# Patient Record
(demographics unavailable — no encounter records)

---

## 2024-11-06 NOTE — PLAN
[FreeTextEntry1] : In regards to patients Physical exam, routine blood work drawn, will review results with patient. \par  \par  Counseling included abnormal lab results, differential diagnoses, treatment options, risks and benefits, lifestyle changes, current condition, medications, and dose adjustments. \par  The patient was interactive, attentive, asked questions, and verbalized understanding

## 2024-11-06 NOTE — HEALTH RISK ASSESSMENT
[Good] : ~his/her~  mood as  good [Yes] : Yes [Monthly or less (1 pt)] : Monthly or less (1 point) [1 or 2 (0 pts)] : 1 or 2 (0 points) [Never (0 pts)] : Never (0 points) [No] : In the past 12 months have you used drugs other than those required for medical reasons? No [No falls in past year] : Patient reported no falls in the past year [0] : 2) Feeling down, depressed, or hopeless: Not at all (0) [PHQ-2 Negative - No further assessment needed] : PHQ-2 Negative - No further assessment needed [Audit-CScore] : 1 [[id number]] : 0 [Never] : Never [NO] : No [Patient declined Low Dose CT Scan] : Patient declined Low Dose CT Scan [Patient declined Retinal Exam] : Patient declined Retinal Exam. [Patient reported mammogram was normal] : Patient reported mammogram was normal [Patient declined colonoscopy] : Patient declined colonoscopy [HIV test declined] : HIV test declined [Hepatitis C test declined] : Hepatitis C test declined [MammogramDate] : 10/24 [MammogramComments] : follow up with OB/GYN

## 2024-11-06 NOTE — HISTORY OF PRESENT ILLNESS
[FreeTextEntry1] : physical exam  [de-identified] : Ms. LANDY QUINTERO is a 45 year female comes to the office for physical exam. Patient denies fever, cough SOB. No other complaints at this time.

## 2025-01-29 NOTE — ADDENDUM
[FreeTextEntry1] : I, Iona Fink NP, acted as scribe for Dr. Shelley Weber for this patient encounter

## 2025-01-29 NOTE — REASON FOR VISIT
[Initial Evaluation] : an initial evaluation [FreeTextEntry1] : colon cancer screening, father with polyps

## 2025-01-29 NOTE — HISTORY OF PRESENT ILLNESS
[FreeTextEntry1] : LANDY QUINTERO is a 45 year old female with no significant PMH and on no medications, presenting today for colon cancer screening. No prior colonoscopy. Family history of colon polyps in her father. She feels well and offers no complaints. She denies any abdominal pain, bowel changes, rectal bleeding. She moves her bowels regularly. No upper GI complaints. Appetite is good and weight is stable.

## 2025-01-29 NOTE — PHYSICAL EXAM
[Alert] : alert [Normal Voice/Communication] : normal voice/communication [Healthy Appearing] : healthy appearing [No Acute Distress] : no acute distress [Sclera] : the sclera and conjunctiva were normal [Hearing Threshold Finger Rub Not [place]] : hearing was normal [Normal Lips/Gums] : the lips and gums were normal [Oropharynx] : the oropharynx was normal [Normal Appearance] : the appearance of the neck was normal [No Neck Mass] : no neck mass was observed [No Respiratory Distress] : no respiratory distress [No Acc Muscle Use] : no accessory muscle use [Respiration, Rhythm And Depth] : normal respiratory rhythm and effort [Auscultation Breath Sounds / Voice Sounds] : lungs were clear to auscultation bilaterally [Heart Rate And Rhythm] : heart rate was normal and rhythm regular [Normal S1, S2] : normal S1 and S2 [Murmurs] : no murmurs [Bowel Sounds] : normal bowel sounds [Abdomen Tenderness] : non-tender [No Masses] : no abdominal mass palpated [Abdomen Soft] : soft [] : no hepatosplenomegaly [Oriented To Time, Place, And Person] : oriented to person, place, and time

## 2025-01-29 NOTE — ASSESSMENT
[FreeTextEntry1] : 45 year old female with family history of colon polyps presenting for colon cancer screening. She will be scheduled for a colonoscopy with MiraLAX prep for further evaluation. I have discussed the indications (including but not limited to ruling out inflammatory bowel disease, colorectal neoplasm, GI bleed, and AVM's), benefits, risks  (including but not limited to reaction to the anesthesia, infection, bleeding, missed lesions, and perforation),  and alternatives to colonoscopy with the patient. The patient understands all options and has agreed to have a colonoscopy and is medically optimized for the planned procedure.   I, Dr. Shelley Weber was present during history and physical.  Family history of colon polyps.  This is her first colonoscopy.  Patient has no GI symptoms.  In November hemoglobin was 14.8 LFTs normal Abdominal exam positive bowel sounds soft nontender I agree with the above assessment and plan

## 2025-04-09 NOTE — ASSESSMENT
[FreeTextEntry1] : A/P family hx of colon polyps   I discussed the risks and benefits of colonoscopy and patient was given opportunity to ask questions. Colonoscopy to r/o colon cancer, polyps, AVM's. Patient is medically optimized for the procedure